# Patient Record
Sex: MALE | Race: WHITE | URBAN - METROPOLITAN AREA
[De-identification: names, ages, dates, MRNs, and addresses within clinical notes are randomized per-mention and may not be internally consistent; named-entity substitution may affect disease eponyms.]

---

## 2017-08-25 ENCOUNTER — TELEPHONE (OUTPATIENT)
Dept: FAMILY MEDICINE | Facility: CLINIC | Age: 32
End: 2017-08-25

## 2017-08-25 NOTE — TELEPHONE ENCOUNTER
LOV: 2012    Patient calling stating his testicle is swollen    Concern - Swollen L Testicle  Onset: 4 days    Description:   L testicle is sore and a little painful, swollen    Intensity: moderate, 2/10    Progression of Symptoms:  same and intermittent    Accompanying Signs & Symptoms:  None    Previous history of similar problem:   No    Precipitating factors:   Worsened by: sitting down    Alleviating factors:  Improved by: none    Therapies Tried and outcome: Ibuprofen - did help a little    DENIES: CP, SOB, Difficulty breathing, dizziness/lightheadedness, N/V, severe HA, Trauma to penis or scrotum, inability to urinate, discharge    Advised patient to go to [facility] - Patient stated an understanding and agreed with plan.    Haven Zelaya RN  [facility]

## 2017-08-26 ENCOUNTER — OFFICE VISIT (OUTPATIENT)
Dept: FAMILY MEDICINE | Facility: CLINIC | Age: 32
End: 2017-08-26
Payer: COMMERCIAL

## 2017-08-26 VITALS
TEMPERATURE: 98.2 F | BODY MASS INDEX: 20.51 KG/M2 | HEART RATE: 82 BPM | DIASTOLIC BLOOD PRESSURE: 70 MMHG | HEIGHT: 76 IN | OXYGEN SATURATION: 99 % | SYSTOLIC BLOOD PRESSURE: 112 MMHG | WEIGHT: 168.4 LBS

## 2017-08-26 DIAGNOSIS — N45.1 EPIDIDYMITIS, LEFT: Primary | ICD-10-CM

## 2017-08-26 PROCEDURE — 99213 OFFICE O/P EST LOW 20 MIN: CPT | Performed by: FAMILY MEDICINE

## 2017-08-26 PROCEDURE — 87491 CHLMYD TRACH DNA AMP PROBE: CPT | Performed by: FAMILY MEDICINE

## 2017-08-26 PROCEDURE — 87591 N.GONORRHOEAE DNA AMP PROB: CPT | Performed by: FAMILY MEDICINE

## 2017-08-26 RX ORDER — LEVOFLOXACIN 500 MG/1
500 TABLET, FILM COATED ORAL DAILY
Qty: 10 TABLET | Refills: 0 | Status: SHIPPED | OUTPATIENT
Start: 2017-08-26 | End: 2017-09-12

## 2017-08-26 NOTE — NURSING NOTE
"Chief Complaint   Patient presents with     Testicular/scrotal Pain       Initial /70  Pulse 82  Temp 98.2  F (36.8  C) (Oral)  Wt 168 lb 6.4 oz (76.4 kg)  SpO2 99%  BMI 20.5 kg/m2 Estimated body mass index is 20.5 kg/(m^2) as calculated from the following:    Height as of 2/17/12: 6' 4\" (1.93 m).    Weight as of this encounter: 168 lb 6.4 oz (76.4 kg).  Medication Reconciliation: complete     Garima Melgoza MA      "

## 2017-08-26 NOTE — MR AVS SNAPSHOT
"              After Visit Summary   8/26/2017    Mike Quach    MRN: [id number]           Patient Information     Date Of Birth          1985        Visit Information        Provider Department      8/26/2017 8:40 AM Weiler, Karen, MD [facility]        Today's Diagnoses     Epididymitis, left    -  1       Follow-ups after your visit        Who to contact     If you have questions or need follow up information about today's clinic visit or your schedule please contact [facility] directly at [phone].  Normal or non-critical lab and imaging results will be communicated to you by MyChart, letter or phone within 4 business days after the clinic has received the results. If you do not hear from us within 7 days, please contact the clinic through Hashgot or phone. If you have a critical or abnormal lab result, we will notify you by phone as soon as possible.  Submit refill requests through EntrenaYa or call your pharmacy and they will forward the refill request to us. Please allow 3 business days for your refill to be completed.          Additional Information About Your Visit        MyChart Information     EntrenaYa gives you secure access to your electronic health record. If you see a primary care provider, you can also send messages to your care team and make appointments. If you have questions, please call your primary care clinic.  If you do not have a primary care provider, please call [phone] and they will assist you.        Care EveryWhere ID     This is your Care EveryWhere ID. This could be used by other organizations to access your [place] medical records  [id number]        Your Vitals Were     Pulse Temperature Height Pulse Oximetry BMI (Body Mass Index)       82 98.2  F (36.8  C) (Oral) 6' 4\" (1.93 m) 99% 20.5 kg/m2        Blood Pressure from Last 3 Encounters:   08/26/17 112/70   02/17/12 108/64   01/26/12 108/58    Weight from Last 3 Encounters:   08/26/17 168 " lb 6.4 oz (76.4 kg)   02/17/12 156 lb 6.4 oz (70.9 kg)   01/26/12 164 lb 8 oz (74.6 kg)              We Performed the Following     CHLAMYDIA TRACHOMATIS PCR     NEISSERIA GONORRHOEA PCR          Today's Medication Changes          These changes are accurate as of: 8/26/17 11:59 PM.  If you have any questions, ask your nurse or doctor.               Start taking these medicines.        Dose/Directions    levofloxacin 500 MG tablet   Commonly known as:  LEVAQUIN   Used for:  Epididymitis, left   Started by:  Weiler, Karen, MD        Dose:  500 mg   Take 1 tablet (500 mg) by mouth daily   Quantity:  10 tablet   Refills:  0            Where to get your medicines      These medications were sent to [facility] [place] - [address], [facility] [address]     Phone:  [phone]     levofloxacin 500 MG tablet                Primary Care Provider Office Phone #    [facility] [phone]       No address on file        Equal Access to Services     DANYELLE ESPITIA AH: Hadii honorio ku hadasho Soomaali, waaxda luqadaha, qaybta kaalmada adeegyada, waxay idiin hayalena tsai . So [facility] [phone].    ATENCIÓN: Si habla español, tiene a bustamante disposición servicios gratuitos de asistencia lingüística. Llame al [phone].    We comply with applicable federal civil rights laws and [place] laws. We do not discriminate on the basis of race, color, national origin, age, disability sex, sexual orientation or gender identity.            Thank you!     Thank you for choosing [facility]  for your care. Our goal is always to provide you with excellent care. Hearing back from our patients is one way we can continue to improve our services. Please take a few minutes to complete the written survey that you may receive in the mail after your visit with us. Thank you!             Your Updated Medication List - Protect others around  you: Learn how to safely use, store and throw away your medicines at www.disposemymeds.org.          This list is accurate as of: 8/26/17 11:59 PM.  Always use your most recent med list.                   Brand Name Dispense Instructions for use Diagnosis    levofloxacin 500 MG tablet    LEVAQUIN    10 tablet    Take 1 tablet (500 mg) by mouth daily    Epididymitis, left

## 2017-08-26 NOTE — PROGRESS NOTES
"  SUBJECTIVE:   Mike Quach is a 32 year old male who presents to clinic today for the following health issues:      Triage note from yesterday:  Concern - Swollen L Testicle  Onset: 4 days    Description:   L testicle is sore and a little painful, swollen    Intensity: moderate, 2/10    Progression of Symptoms:  same and intermittent    Accompanying Signs & Symptoms:  None    Previous history of similar problem:   No    Precipitating factors:   Worsened by: sitting down    Alleviating factors:  Improved by: none     Therapies Tried and outcome: Ibuprofen - did help a little     DENIES: CP, SOB, Difficulty breathing, dizziness/lightheadedness, N/V, severe HA, Trauma to penis or scrotum, inability to urinate, discharge      Problem list and histories reviewed & adjusted, as indicated.  Additional history: as documented    Reviewed and updated as needed this visit by clinical staff       Reviewed and updated as needed this visit by Provider         ROS:  Constitutional, HEENT, cardiovascular, pulmonary, gi and gu systems are negative, except as otherwise noted.      OBJECTIVE:   /70  Pulse 82  Temp 98.2  F (36.8  C) (Oral)  Ht 6' 4\" (1.93 m)  Wt 168 lb 6.4 oz (76.4 kg)  SpO2 99%  BMI 20.5 kg/m2  Body mass index is 20.5 kg/(m^2).  GENERAL: healthy, alert and no distress  EYES: Eyes grossly normal to inspection, PERRL and conjunctivae and sclerae normal  HENT: ear canals and TM's normal, nose and mouth without ulcers or lesions  NECK: no adenopathy, no asymmetry, masses, or scars and thyroid normal to palpation  RESP: lungs clear to auscultation - no rales, rhonchi or wheezes  CV: regular rate and rhythm, normal S1 S2, no S3 or S4, no murmur, click or rub, no peripheral edema and peripheral pulses strong  ABDOMEN: soft, nontender, no hepatosplenomegaly, no masses and bowel sounds normal  MS: no gross musculoskeletal defects noted, no edema  :  +tenderness over epididymis. No testicular masses.  "     ASSESSMENT/PLAN:         ICD-10-CM    1. Epididymitis, left N45.1 levofloxacin (LEVAQUIN) 500 MG tablet     NEISSERIA GONORRHOEA PCR     CHLAMYDIA TRACHOMATIS PCR   If symptoms  Not improving, will need testicular US.          Karen Weiler, MD  [facility] [place]

## 2017-08-27 LAB
C TRACH DNA SPEC QL NAA+PROBE: NEGATIVE
N GONORRHOEA DNA SPEC QL NAA+PROBE: NEGATIVE
SPECIMEN SOURCE: NORMAL
SPECIMEN SOURCE: NORMAL

## 2017-08-28 NOTE — PROGRESS NOTES
Dear Mike,    Your gonorrhea and chlamydia testing came back negative/normal.    Please contact the clinic if you have additional questions.  Thank you.    Sincerely,    Luciana Villatoro PA-C  Physician extender for Dr. Karen Weiler

## 2017-09-12 ENCOUNTER — OFFICE VISIT (OUTPATIENT)
Dept: FAMILY MEDICINE | Facility: CLINIC | Age: 32
End: 2017-09-12
Payer: COMMERCIAL

## 2017-09-12 VITALS
SYSTOLIC BLOOD PRESSURE: 112 MMHG | HEIGHT: 76 IN | BODY MASS INDEX: 20.34 KG/M2 | WEIGHT: 167 LBS | DIASTOLIC BLOOD PRESSURE: 70 MMHG | OXYGEN SATURATION: 100 % | HEART RATE: 75 BPM | TEMPERATURE: 98.1 F

## 2017-09-12 DIAGNOSIS — N45.1 EPIDIDYMITIS, LEFT: Primary | ICD-10-CM

## 2017-09-12 DIAGNOSIS — N50.812 LEFT TESTICULAR PAIN: ICD-10-CM

## 2017-09-12 PROCEDURE — 96372 THER/PROPH/DIAG INJ SC/IM: CPT | Performed by: PHYSICIAN ASSISTANT

## 2017-09-12 PROCEDURE — 99214 OFFICE O/P EST MOD 30 MIN: CPT | Mod: 25 | Performed by: PHYSICIAN ASSISTANT

## 2017-09-12 RX ORDER — CEFTRIAXONE SODIUM 250 MG/1
250 INJECTION, POWDER, FOR SOLUTION INTRAMUSCULAR; INTRAVENOUS ONCE
Qty: 1.25 ML | Refills: 0 | OUTPATIENT
Start: 2017-09-12 | End: 2017-09-12

## 2017-09-12 RX ORDER — DOXYCYCLINE 100 MG/1
100 CAPSULE ORAL 2 TIMES DAILY
Qty: 20 CAPSULE | Refills: 0 | Status: SHIPPED | OUTPATIENT
Start: 2017-09-12 | End: 2017-09-22

## 2017-09-12 NOTE — PROGRESS NOTES
SUBJECTIVE:                                                    Mike Quach is a 32 year old male who presents to clinic today for the following health issues:      Left Testicle Pain Follow Up  Mike reports recurrent left testicular pain that radiates into lower abdomen that developed on Thursday 9/7. He has associated swelling of left testicle. He was previously seen for similar pain on 8/26 by Dr Weiler who prescribed levofloxacin. Gonorrhea and chlamydia screens were negative. Patient finished the full 10 day course of antibiotics, and pain resolved completely. Swelling also reduced, but did not resolve. After some consideration, he believes that he was pain free for approximately 2 weeks. He describes that the pain is similar in intensity to other recent episode. He denies fever or fever sensation.  No new sexual partners. He had multiple inguinal hernia repair in 2013.     Problem list and histories reviewed & adjusted, as indicated.  Additional history: as documented    Patient Active Problem List   Diagnosis     CARDIOVASCULAR SCREENING; LDL GOAL LESS THAN 160     Past Surgical History:   Procedure Laterality Date     HERNIA REPAIR Bilateral 2013       Social History   Substance Use Topics     Smoking status: Never Smoker     Smokeless tobacco: Never Used     Alcohol use Yes      Comment: occ     History reviewed. No pertinent family history.      Current Outpatient Prescriptions   Medication Sig Dispense Refill     doxycycline (VIBRAMYCIN) 100 MG capsule Take 1 capsule (100 mg) by mouth 2 times daily for 10 days 20 capsule 0     cefTRIAXone (ROCEPHIN) 250 MG injection Inject 250 mg into the muscle once for 1 dose 1.25 mL 0     No Known Allergies      ROS:  Constitutional, HEENT, cardiovascular, pulmonary, GI, , musculoskeletal, neuro, skin, endocrine and psych systems are negative, except as otherwise noted.    This document serves as a record of the services and decisions personally performed and made by  "Darcie Wade PA-C. It was created on her behalf by Marisela Garcia, a trained medical scribe. The creation of this document is based on the provider's statements to the medical scribe.  Marisela Garcia 2:01 PM September 12, 2017    OBJECTIVE:   /70 (BP Location: Right arm, Patient Position: Chair, Cuff Size: Adult Regular)  Pulse 75  Temp 98.1  F (36.7  C) (Oral)  Ht 6' 4\" (1.93 m)  Wt 167 lb (75.8 kg)  SpO2 100%  BMI 20.33 kg/m2  Body mass index is 20.33 kg/(m^2).  GENERAL: healthy, alert and no distress   (male): tenderness over epididymis. No testicular masses. Normal male genitalia without lesions or urethral discharge, no hernia  RECTAL (male): normal sphincter tone, no rectal masses, prostate normal size, smooth, nontender without nodules or masses  PSYCH: mentation appears normal, affect normal/bright      Diagnostic Test Results:  none     ASSESSMENT/PLAN:   Mike was seen today for groin pain.    Diagnoses and all orders for this visit:    Epididymitis, left, Left testicular pain - recurrent.  Concern of underlying infectious etiology due to temporary pain relief with levaquin.  Starting doxycycline and rocephin. If not improved in 5 days, patient will schedule US.    -     doxycycline (VIBRAMYCIN) 100 MG capsule; Take 1 capsule (100 mg) by mouth 2 times daily for 10 days  -     cefTRIAXone (ROCEPHIN) 250 MG injection; Inject 250 mg into the muscle once for 1 dose  -     US Testicular & Scrotum w Doppler Ltd; Future      The information in this document, created by the medical scribe for me, accurately reflects the services I personally performed and the decisions made by me. I have reviewed and approved this document for accuracy prior to leaving the patient care area.  September 12, 2017 2:11 PM    Darcie Wade PA-C  [facility] [place]    "

## 2017-09-12 NOTE — NURSING NOTE
"Chief Complaint   Patient presents with     Groin Pain     Initial /70 (BP Location: Right arm, Patient Position: Chair, Cuff Size: Adult Regular)  Pulse 75  Temp 98.1  F (36.7  C) (Oral)  Ht 6' 4\" (1.93 m)  Wt 167 lb (75.8 kg)  SpO2 100%  BMI 20.33 kg/m2 Estimated body mass index is 20.33 kg/(m^2) as calculated from the following:    Height as of this encounter: 6' 4\" (1.93 m).    Weight as of this encounter: 167 lb (75.8 kg).  BP completed using cuff size regular right Arm  Imelda Aujean marie CMA    "

## 2017-09-12 NOTE — PATIENT INSTRUCTIONS
Treating Epididymitis and Orchitis    You have inflammation of the epididymis (epididymitis) and testicle (orchitis). This is likely due to an infection. In many cases, epididymitis and orchitis occur along with urinary tract infections. Treatment includes medicine to get rid of the infection. It also includes medicine and other methods to relieve symptoms.  Possible treatments    Antibiotics. Acute epididymitis is most often treated with oral antibiotics. You may also be given an injection of antibiotics. Be sure to take all of your medicine until it is gone, even if you feel better.    Anti-inflammatories. You may be prescribed medicine to reduce swelling and tenderness.    Rest. You will most likely need to rest for 3 to 4 days until swelling and fever are gone. When you are able, lie down with a towel folded under the scrotum to raise it slightly. This can help relieve discomfort.    Scrotal support. If your testicles are swollen, wear an athletic supporter (jockstrap) or spandex shorts. This may help control swelling and ease symptoms.    Ice and heat. To relieve swelling, use an ice pack wrapped in a thin towel on the scrotum. Once swelling is gone, sit in a warm bath to increase blood flow to the area.  After treatment  The inflammation will go away with treatment. But you may have an achy feeling in the testicles for 2 to 4 weeks. This does not mean the infection has come back. The testicles just take time to heal. But if you feel a lump in a testicle after treatment, see your doctor. Once the inflammation is gone, you can be active again.  If you are sexually active, your partner(s) need to see a healthcare provider as well. This is because sex can sometimes spread the infection that causes this condition.   Date Last Reviewed: 1/1/2017 2000-2017 [employer]. [address] [place] [address], [place] [address]. All rights reserved. This information is not intended as a substitute for professional  medical care. Always follow your healthcare professional's instructions.

## 2017-09-12 NOTE — MR AVS SNAPSHOT
After Visit Summary   9/12/2017    Mike Quach    MRN: [id number]           Patient Information     Date Of Birth          1985        Visit Information        Provider Department      9/12/2017 1:40 PM Darcie Wade PA-C [facility] [place]        Today's Diagnoses     Epididymitis, left    -  1    Left testicular pain          Care Instructions      Treating Epididymitis and Orchitis    You have inflammation of the epididymis (epididymitis) and testicle (orchitis). This is likely due to an infection. In many cases, epididymitis and orchitis occur along with urinary tract infections. Treatment includes medicine to get rid of the infection. It also includes medicine and other methods to relieve symptoms.  Possible treatments    Antibiotics. Acute epididymitis is most often treated with oral antibiotics. You may also be given an injection of antibiotics. Be sure to take all of your medicine until it is gone, even if you feel better.    Anti-inflammatories. You may be prescribed medicine to reduce swelling and tenderness.    Rest. You will most likely need to rest for 3 to 4 days until swelling and fever are gone. When you are able, lie down with a towel folded under the scrotum to raise it slightly. This can help relieve discomfort.    Scrotal support. If your testicles are swollen, wear an athletic supporter (jockstrap) or spandex shorts. This may help control swelling and ease symptoms.    Ice and heat. To relieve swelling, use an ice pack wrapped in a thin towel on the scrotum. Once swelling is gone, sit in a warm bath to increase blood flow to the area.  After treatment  The inflammation will go away with treatment. But you may have an achy feeling in the testicles for 2 to 4 weeks. This does not mean the infection has come back. The testicles just take time to heal. But if you feel a lump in a testicle after treatment, see your doctor. Once the inflammation is gone, you can be  active again.  If you are sexually active, your partner(s) need to see a healthcare provider as well. This is because sex can sometimes spread the infection that causes this condition.   Date Last Reviewed: 1/1/2017 2000-2017 [employer]. [address] [place] [address], [place] [address]. All rights reserved. This information is not intended as a substitute for professional medical care. Always follow your healthcare professional's instructions.                Follow-ups after your visit        Future tests that were ordered for you today     Open Future Orders        Priority Expected Expires Ordered    US Testicular & Scrotum w Doppler Ltd Routine  9/12/2018 9/12/2017            Who to contact     If you have questions or need follow up information about today's clinic visit or your schedule please contact [facility] directly at [phone].  Normal or non-critical lab and imaging results will be communicated to you by Filecubedhart, letter or phone within 4 business days after the clinic has received the results. If you do not hear from us within 7 days, please contact the clinic through Filecubedhart or phone. If you have a critical or abnormal lab result, we will notify you by phone as soon as possible.  Submit refill requests through [email or web address] or call your pharmacy and they will forward the refill request to us. Please allow 3 business days for your refill to be completed.          Additional Information About Your Visit        [email or web address] Information     [email or web address] gives you secure access to your electronic health record. If you see a primary care provider, you can also send messages to your care team and make appointments. If you have questions, please call your primary care clinic.  If you do not have a primary care provider, please call [phone] and they will assist you.        Care EveryWhere ID     This is your Care EveryWhere ID. This could be used by other organizations to access your  "[place] medical records  [id number]        Your Vitals Were     Pulse Temperature Height Pulse Oximetry BMI (Body Mass Index)       75 98.1  F (36.7  C) (Oral) 6' 4\" (1.93 m) 100% 20.33 kg/m2        Blood Pressure from Last 3 Encounters:   09/12/17 112/70   08/26/17 112/70   02/17/12 108/64    Weight from Last 3 Encounters:   09/12/17 167 lb (75.8 kg)   08/26/17 168 lb 6.4 oz (76.4 kg)   02/17/12 156 lb 6.4 oz (70.9 kg)                 Today's Medication Changes          These changes are accurate as of: 9/12/17  2:10 PM.  If you have any questions, ask your nurse or doctor.               Start taking these medicines.        Dose/Directions    cefTRIAXone 250 MG injection   Commonly known as:  ROCEPHIN   Used for:  Epididymitis, left   Started by:  Darcie Wade PA-C        Dose:  250 mg   Inject 250 mg into the muscle once for 1 dose   Quantity:  1.25 mL   Refills:  0       doxycycline 100 MG capsule   Commonly known as:  VIBRAMYCIN   Used for:  Left testicular pain, Epididymitis, left   Started by:  Darcie Wade PA-C        Dose:  100 mg   Take 1 capsule (100 mg) by mouth 2 times daily for 10 days   Quantity:  20 capsule   Refills:  0            Where to get your medicines      These medications were sent to [facility] [address]  [address] [place] [address]     Phone:  [phone]     doxycycline 100 MG capsule         Some of these will need a paper prescription and others can be bought over the counter.  Ask your nurse if you have questions.     You don't need a prescription for these medications     cefTRIAXone 250 MG injection                Primary Care Provider Office Phone #    [facility] [phone]       No address on file        Equal Access to Services     DANYELLE ESPITIA AH: Paramjit Driscoll, waaxda luqadaha, qaybta kaalmada adeegyada, giuliana schneider. So wa " [phone].    ATENCIÓN: Si miryam olivia, tiene a bustamante disposición servicios gratuitos de asistencia lingüística. Marleny al [phone].    We comply with applicable federal civil rights laws and [place] laws. We do not discriminate on the basis of race, color, national origin, age, disability sex, sexual orientation or gender identity.            Thank you!     Thank you for choosing [facility]  for your care. Our goal is always to provide you with excellent care. Hearing back from our patients is one way we can continue to improve our services. Please take a few minutes to complete the written survey that you may receive in the mail after your visit with us. Thank you!             Your Updated Medication List - Protect others around you: Learn how to safely use, store and throw away your medicines at www.disposemymeds.org.          This list is accurate as of: 9/12/17  2:10 PM.  Always use your most recent med list.                   Brand Name Dispense Instructions for use Diagnosis    cefTRIAXone 250 MG injection    ROCEPHIN    1.25 mL    Inject 250 mg into the muscle once for 1 dose    Epididymitis, left       doxycycline 100 MG capsule    VIBRAMYCIN    20 capsule    Take 1 capsule (100 mg) by mouth 2 times daily for 10 days    Left testicular pain, Epididymitis, left

## 2017-09-22 ENCOUNTER — HOSPITAL ENCOUNTER (OUTPATIENT)
Dept: ULTRASOUND IMAGING | Facility: CLINIC | Age: 32
Discharge: HOME | End: 2017-09-22
Attending: PHYSICIAN ASSISTANT | Admitting: PHYSICIAN ASSISTANT
Payer: COMMERCIAL

## 2017-09-22 DIAGNOSIS — N50.812 LEFT TESTICULAR PAIN: ICD-10-CM

## 2017-09-22 PROCEDURE — 93976 VASCULAR STUDY: CPT

## 2017-09-22 NOTE — PROGRESS NOTES
Mike  Here are your recent results.  You have a small varicocele near the area of tenderness on the left testes.   This is a collection of blood vessels and is benign.  It shouldn't require any follow up unless it becomes painful or larger.  Here is a great place to read about this if you would like: [email or web address].      If you have any questions or if you are not improving please do not hesitate to contact our office via phone ([phone]) or MyChart.    Darcie Wade, MS, PA-C  [facility]

## 2019-07-08 ENCOUNTER — OFFICE VISIT (OUTPATIENT)
Dept: FAMILY MEDICINE | Facility: CLINIC | Age: 34
End: 2019-07-08
Payer: COMMERCIAL

## 2019-07-08 VITALS
TEMPERATURE: 98.2 F | RESPIRATION RATE: 14 BRPM | BODY MASS INDEX: 20.7 KG/M2 | DIASTOLIC BLOOD PRESSURE: 80 MMHG | SYSTOLIC BLOOD PRESSURE: 122 MMHG | HEIGHT: 76 IN | WEIGHT: 170 LBS | OXYGEN SATURATION: 98 % | HEART RATE: 72 BPM

## 2019-07-08 DIAGNOSIS — T15.92XA EYE FOREIGN BODY, LEFT, INITIAL ENCOUNTER: ICD-10-CM

## 2019-07-08 DIAGNOSIS — Q15.9 EYE ABNORMALITY: Primary | ICD-10-CM

## 2019-07-08 PROCEDURE — 99213 OFFICE O/P EST LOW 20 MIN: CPT | Performed by: NURSE PRACTITIONER

## 2019-07-08 ASSESSMENT — MIFFLIN-ST. JEOR: SCORE: 1822.61

## 2019-07-08 NOTE — PROGRESS NOTES
"Subjective     Mike Quach is a 34 year old male who presents to clinic today for the following health issues:    HPI   Eye(s) Problem  Onset: 2 weeks ago    Description:   Location: left  Pain: YES  Redness: YES    Accompanying Signs & Symptoms:  Discharge/mattering: no   Swelling: no   Visual changes: no   Fever: no   Nasal Congestion: no   Bothered by bright lights: no     History:   Trauma: YES- metal may have flown into it  Foreign body exposure: YES- may be a piece of metal    Precipitating factors:   Wearing contacts: no     Alleviating factors:  Improved by: none    Working on grinding in garage and eft eye started to hurt   Looked like something was in it   Then was fine for 2 weeks     Feels like there is a spot on bottom of colored part of eye  Feels inside eyelids are rough from being scraped       Therapies Tried and outcome: none            Reviewed and updated as needed this visit by Provider  Tobacco  Allergies  Meds  Problems  Med Hx  Surg Hx  Fam Hx         Review of Systems   ROS COMP: Constitutional, HEENT, cardiovascular, pulmonary, gi and gu systems are negative, except as otherwise noted.      Objective    /80   Pulse 72   Temp 98.2  F (36.8  C) (Oral)   Resp 14   Ht 1.93 m (6' 4\")   Wt 77.1 kg (170 lb)   SpO2 98%   BMI 20.69 kg/m    Body mass index is 20.69 kg/m .  Physical Exam   GENERAL: alert and eye pain left eye   EYES: left eye with injection and tearing   Fluorescein dye used to left eye and bottom of colored part of eye there is a circular lesion seen - possibly the sharp of metal   Right eye appears to be normal   RESP:lungs clear to auscultation - no rales, rhonchi or wheezes  CV: regular rate and rhythm, normal S1 S2, no S3 or S4, no murmur, click or rub, no peripheral edema and peripheral pulses strong  PSYCH: mentation appears normal, affect normal/bright    Diagnostic Test Results:  Labs reviewed in Epic        Assessment & Plan     1. Eye abnormality  With " dye to eye there is a circular lesion bottom part of colored part of eye   Called eye specialist and they will see today   - OPHTHALMOLOGY ADULT REFERRAL    2. Eye foreign body, left, initial encounter    - OPHTHALMOLOGY ADULT REFERRAL       Patient Instructions   [facility]: [facility] [place] [phone]    Dr Cheney  345 pm   7/8/2019  [address] [facility]   [place]        Return in about 1 day (around 7/9/2019) for see eye specialist .    ABNER Castillo [facility]

## 2019-07-08 NOTE — PATIENT INSTRUCTIONS
[facility]: [facility] [place] [phone]    Dr Cheney  345 pm   7/8/2019  [address] [place]

## 2019-10-03 ENCOUNTER — HEALTH MAINTENANCE LETTER (OUTPATIENT)
Age: 34
End: 2019-10-03

## 2020-10-29 ENCOUNTER — TRANSFERRED RECORDS (OUTPATIENT)
Dept: HEALTH INFORMATION MANAGEMENT | Facility: CLINIC | Age: 35
End: 2020-10-29

## 2020-12-23 ENCOUNTER — PRE VISIT (OUTPATIENT)
Dept: UROLOGY | Facility: CLINIC | Age: 35
End: 2020-12-23

## 2020-12-23 NOTE — TELEPHONE ENCOUNTER
Reason for Visit: Consult to discuss fertility    Orders/Procedures/Records: SA in system     Contact Patient: n/a    Rooming Requirements: normal      Aurora Del Castillo LPN  12/23/20  3:42 PM

## 2021-01-06 ENCOUNTER — TELEPHONE (OUTPATIENT)
Dept: UROLOGY | Facility: CLINIC | Age: 36
End: 2021-01-06

## 2021-01-06 NOTE — TELEPHONE ENCOUNTER
Left message to change appointment with Dr Garcias on 1/14 to virtual. Ask patient to call back to confirm.

## 2021-01-08 ENCOUNTER — TELEPHONE (OUTPATIENT)
Dept: UROLOGY | Facility: CLINIC | Age: 36
End: 2021-01-08

## 2021-01-08 NOTE — TELEPHONE ENCOUNTER
Talked to patient to change appointment to virtual. Dr Garcias 1/14. Patient said he might cancel the appointment but is going to confirm with his wife first. He will call back to either cancel or change to virtual.

## 2021-01-15 ENCOUNTER — HEALTH MAINTENANCE LETTER (OUTPATIENT)
Age: 36
End: 2021-01-15

## 2021-09-05 ENCOUNTER — HEALTH MAINTENANCE LETTER (OUTPATIENT)
Age: 36
End: 2021-09-05

## 2022-02-20 ENCOUNTER — HEALTH MAINTENANCE LETTER (OUTPATIENT)
Age: 37
End: 2022-02-20

## 2022-10-23 ENCOUNTER — HEALTH MAINTENANCE LETTER (OUTPATIENT)
Age: 37
End: 2022-10-23

## 2023-04-02 ENCOUNTER — HEALTH MAINTENANCE LETTER (OUTPATIENT)
Age: 38
End: 2023-04-02